# Patient Record
Sex: MALE | Race: WHITE | Employment: STUDENT | URBAN - METROPOLITAN AREA
[De-identification: names, ages, dates, MRNs, and addresses within clinical notes are randomized per-mention and may not be internally consistent; named-entity substitution may affect disease eponyms.]

---

## 2020-05-31 ENCOUNTER — HOSPITAL ENCOUNTER (EMERGENCY)
Facility: HOSPITAL | Age: 25
Discharge: HOME | End: 2020-05-31
Attending: EMERGENCY MEDICINE
Payer: COMMERCIAL

## 2020-05-31 VITALS
DIASTOLIC BLOOD PRESSURE: 93 MMHG | HEIGHT: 71 IN | OXYGEN SATURATION: 100 % | WEIGHT: 160 LBS | HEART RATE: 60 BPM | TEMPERATURE: 100 F | SYSTOLIC BLOOD PRESSURE: 132 MMHG | BODY MASS INDEX: 22.4 KG/M2 | RESPIRATION RATE: 16 BRPM

## 2020-05-31 DIAGNOSIS — R11.2 NAUSEA VOMITING AND DIARRHEA: Primary | ICD-10-CM

## 2020-05-31 DIAGNOSIS — R19.7 NAUSEA VOMITING AND DIARRHEA: Primary | ICD-10-CM

## 2020-05-31 PROCEDURE — 96361 HYDRATE IV INFUSION ADD-ON: CPT

## 2020-05-31 PROCEDURE — 99284 EMERGENCY DEPT VISIT MOD MDM: CPT

## 2020-05-31 PROCEDURE — 83690 ASSAY OF LIPASE: CPT | Performed by: EMERGENCY MEDICINE

## 2020-05-31 PROCEDURE — 81001 URINALYSIS AUTO W/SCOPE: CPT | Performed by: EMERGENCY MEDICINE

## 2020-05-31 PROCEDURE — 96374 THER/PROPH/DIAG INJ IV PUSH: CPT

## 2020-05-31 PROCEDURE — 80053 COMPREHEN METABOLIC PANEL: CPT | Performed by: EMERGENCY MEDICINE

## 2020-05-31 PROCEDURE — 85025 COMPLETE CBC W/AUTO DIFF WBC: CPT | Performed by: EMERGENCY MEDICINE

## 2020-05-31 RX ORDER — ONDANSETRON 2 MG/ML
4 INJECTION INTRAMUSCULAR; INTRAVENOUS ONCE
Status: COMPLETED | OUTPATIENT
Start: 2020-05-31 | End: 2020-05-31

## 2020-05-31 RX ORDER — ONDANSETRON 4 MG/1
4 TABLET, ORALLY DISINTEGRATING ORAL EVERY 4 HOURS PRN
Qty: 10 TABLET | Refills: 0 | Status: SHIPPED | OUTPATIENT
Start: 2020-05-31 | End: 2020-06-07

## 2020-05-31 RX ORDER — POTASSIUM CHLORIDE 20 MEQ/1
40 TABLET, EXTENDED RELEASE ORAL ONCE
Status: COMPLETED | OUTPATIENT
Start: 2020-05-31 | End: 2020-05-31

## 2020-05-31 NOTE — ED PROVIDER NOTES
Patient Seen in: [facility] Emergency Department      History   Patient presents with:  Nausea/Vomiting/Diarrhea    Stated Complaint: NAUSEA, LIGHTHEADED, NUMBNESS IN HANDS, ABD PAIN    HPI    Diliamelida Garcia is a pleasant 59-year-old male presenting with vomi no nuchal rigidity  Lungs are clear to auscultation bilaterally with no wheezes retractions or rhonchi noted  Cardiovascular exam shows a regular rate and rhythm  Abdomen soft nontender with no rebound tenderness noted  Extremity exam shows no clubbing cya [place] [id number]  [phone]    In 3 days            Medications Prescribed:  Current Discharge Medication List    START taking these medications    !! ondansetron 4 MG Oral Tablet Dispersible  Take 1 tablet (4 mg total

## 2020-05-31 NOTE — ED INITIAL ASSESSMENT (HPI)
Patient here with c/o N/V/D since Wednesday. Patient has had 2 episodes of diarrhea per day since Wednesday and 2 episodes total of emesis. Patient woke up today feeling fatigued. Denies fever.   Patient also having abdominal pain

## 2020-05-31 NOTE — ED NOTES
Patient ready for discharge. Follow-up with PCP discussed. Patient tolerating PO. Patient AOx3 with no signs of acute distress.

## 2020-05-31 NOTE — ED NOTES
Patient up to restroom with steady gait. Patient AOx3, reports feeling \"a little better\" after fluids and medication. \"